# Patient Record
(demographics unavailable — no encounter records)

---

## 2025-03-28 NOTE — PHYSICAL EXAM
[No Acute Distress] : no acute distress [Well-Appearing] : well-appearing [No Respiratory Distress] : no respiratory distress  [No Accessory Muscle Use] : no accessory muscle use [Clear to Auscultation] : lungs were clear to auscultation bilaterally [Normal Rate] : normal rate  [Regular Rhythm] : with a regular rhythm [Normal S1, S2] : normal S1 and S2 [No Murmur] : no murmur heard [No Edema] : there was no peripheral edema [Soft] : abdomen soft [Non Tender] : non-tender [Non-distended] : non-distended [No Masses] : no abdominal mass palpated [Normal Affect] : the affect was normal [Alert and Oriented x3] : oriented to person, place, and time [Normal Insight/Judgement] : insight and judgment were intact [No JVD] : no jugular venous distention [No Varicosities] : no varicosities [Pedal Pulses Present] : the pedal pulses are present [de-identified] : discolored skin of bilateral LE,

## 2025-03-28 NOTE — PHYSICAL EXAM
[No Acute Distress] : no acute distress [Well-Appearing] : well-appearing [No Respiratory Distress] : no respiratory distress  [No Accessory Muscle Use] : no accessory muscle use [Clear to Auscultation] : lungs were clear to auscultation bilaterally [Normal Rate] : normal rate  [Regular Rhythm] : with a regular rhythm [Normal S1, S2] : normal S1 and S2 [No Murmur] : no murmur heard [No Edema] : there was no peripheral edema [Soft] : abdomen soft [Non Tender] : non-tender [Non-distended] : non-distended [No Masses] : no abdominal mass palpated [Normal Affect] : the affect was normal [Alert and Oriented x3] : oriented to person, place, and time [Normal Insight/Judgement] : insight and judgment were intact [No JVD] : no jugular venous distention [No Varicosities] : no varicosities [Pedal Pulses Present] : the pedal pulses are present [de-identified] : discolored skin of bilateral LE,

## 2025-03-28 NOTE — ASSESSMENT
[FreeTextEntry1] : # cold extremities  counseled/Quit smoking/Avoid prolonged standing.  wear compression stockings Vascular referral  # anxiety/Depression  No SI/HI  stable on lexapro 10 mg  # cardiomyopathy  Continue carvedilol 6.25 BID, Losartan 25 mg  Keep BP at goal  cardiology referral  # HLD  on Lipitor 20 mg # T2DM  poorly controlled DM  Continue Lantus 25 units HS, Metformin 1000 mg BID   Advised adherence with medications, regular follow ups. Discussed glycemic goals A1C goal <7.0 Monitor BS fasting in AM and 2-hours after dinner Discussed yearly diabetic eye exams  Referral to endo/nutritional services.  Discussed in detail the risks of uncontrolled DM(risks including but not limited to death, stroke, MI, eye/nerve/kidney damage) counseled on diabetes foot care, long term vascular complications of diabetes, carbohydrate consistent diet, importance of diet and exercise to improve glycemic control, achieve weight loss and improve cardiovascular health, exercise/effect on glucose, hypoglycemia management, self-monitoring of blood glucose, and sick-day management.

## 2025-03-28 NOTE — PHYSICAL EXAM
[No Acute Distress] : no acute distress [Well-Appearing] : well-appearing [No Respiratory Distress] : no respiratory distress  [No Accessory Muscle Use] : no accessory muscle use [Clear to Auscultation] : lungs were clear to auscultation bilaterally [Normal Rate] : normal rate  [Regular Rhythm] : with a regular rhythm [Normal S1, S2] : normal S1 and S2 [No Murmur] : no murmur heard [No Edema] : there was no peripheral edema [Soft] : abdomen soft [Non Tender] : non-tender [Non-distended] : non-distended [No Masses] : no abdominal mass palpated [Normal Affect] : the affect was normal [Alert and Oriented x3] : oriented to person, place, and time [Normal Insight/Judgement] : insight and judgment were intact [No JVD] : no jugular venous distention [No Varicosities] : no varicosities [Pedal Pulses Present] : the pedal pulses are present [de-identified] : discolored skin of bilateral LE,

## 2025-03-28 NOTE — HISTORY OF PRESENT ILLNESS
[FreeTextEntry1] : follow up and rx refill.  [de-identified] : Mr. 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 He ran out his BP medications.  was not able to make cardiology appt.  c/o cold extremities with skin discoloration. Denies leg pain/cramping.   Current smoker - smokes 1/2 pack for 10 days.

## 2025-03-28 NOTE — HISTORY OF PRESENT ILLNESS
[FreeTextEntry1] : follow up and rx refill.  [de-identified] : Mr. 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 He ran out his BP medications.  was not able to make cardiology appt.  c/o cold extremities with skin discoloration. Denies leg pain/cramping.   Current smoker - smokes 1/2 pack for 10 days.

## 2025-03-28 NOTE — HISTORY OF PRESENT ILLNESS
[FreeTextEntry1] : follow up and rx refill.  [de-identified] : Mr. 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 He ran out his BP medications.  was not able to make cardiology appt.  c/o cold extremities with skin discoloration. Denies leg pain/cramping.   Current smoker - smokes 1/2 pack for 10 days.

## 2025-03-28 NOTE — COUNSELING
[Cessation strategies including cessation program discussed] : Cessation strategies including cessation program discussed [Use of nicotine replacement therapies and other medications discussed] : Use of nicotine replacement therapies and other medications discussed [Encouraged to pick a quit date and identify support needed to quit] : Encouraged to pick a quit date and identify support needed to quit [Yes] : Willing to quit smoking [Participate in Class] : Participate in class [FreeTextEntry1] : 10